# Patient Record
Sex: MALE | Race: ASIAN | NOT HISPANIC OR LATINO | URBAN - METROPOLITAN AREA
[De-identification: names, ages, dates, MRNs, and addresses within clinical notes are randomized per-mention and may not be internally consistent; named-entity substitution may affect disease eponyms.]

---

## 2023-08-08 VITALS
RESPIRATION RATE: 15 BRPM | HEART RATE: 64 BPM | SYSTOLIC BLOOD PRESSURE: 169 MMHG | OXYGEN SATURATION: 100 % | DIASTOLIC BLOOD PRESSURE: 84 MMHG | TEMPERATURE: 98 F | WEIGHT: 164.91 LBS | HEIGHT: 69 IN

## 2023-08-08 RX ORDER — CHLORHEXIDINE GLUCONATE 213 G/1000ML
1 SOLUTION TOPICAL ONCE
Refills: 0 | Status: DISCONTINUED | OUTPATIENT
Start: 2023-08-11 | End: 2023-08-12

## 2023-08-08 NOTE — H&P ADULT - ASSESSMENT
Pt is a  61 y/o M, with family hx of CAD in (brother, father, mother, and paternal grandmother former smoker (7.5 ppy smoking hx-  quit 2 months ago), ALLERGY TO ASA,  PMHx of CAD (s/p PCI 2014 ), HTN, HLD, who presents to Dr. Farrar's office with complaints of heaviness in his chest, with mild pain, and SOB on exertion. Patient reports he was recommended for open heart surgery by his doctors back in [facility], but refused. NST 07/27/2023: Small sizes, predominantly fixed, myocardial perfusion, defect of the inferior wall likely due to diaphragmatic attenuation artifact. Post stress EF: 55%. ECHO 07/27/2023: LV cavity size is normal. Mild hypertrophy. Grade I DD. No valvular abnormalities. EF: 60-65%. In light of patient's risk factors, CCS class III  angina equivalent symptoms, and abnormal NST, to undergo cardiac catheterization with possible intervention if clinically indicated.     -Pt H+H, platelets stable, Pt without any reports of BRBPR, hematuria, prior ICH, melena and no recent or previous GI bleed.   -Loaded with: [ ]81mg ASA, [ ]75mg Plavix,  [ ] ASA 325mg [ ] Plavix 600mg, [x] 180 Brilinta [ ] No Load [ ]. due to allergy to aspirin reported at home  -Pt Cr. stable- and LVEF 50%, pre cath fluids ordered per protocol [x]250ml IV bolus over 30 min, [x ] 75cc x2hrs, [ ] 50cc x2hrs, Patient euvolemic on exam.   -Malampatti II  -ASA III    Pt is a Candidate for Moderate Sedation, yes     was used for language [place] ID [id number]    Risks & benefits of procedure and alternative therapy have been explained to the patient including but not limited to: allergic reaction, bleeding w/possible need for blood transfusion, infection, renal and vascular compromise, limb damage, arrhythmia, stroke, vessel dissection/perforation, Myocardial infarction, emergent CABG. Informed consent obtained and in chart     Pt is a  63 y/o M, with family hx of CAD in (brother, father, mother, and paternal grandmother former smoker (7.5 ppy smoking hx-  quit 2 months ago), ALLERGY TO ASA,  PMHx of CAD (s/p PCI 2014 ), HTN, HLD, who presents to Dr. Farrar's office with complaints of heaviness in his chest, with mild pain, and SOB on exertion. Patient reports he was recommended for open heart surgery by his doctors back in [facility], but refused. NST 07/27/2023: Small sizes, predominantly fixed, myocardial perfusion, defect of the inferior wall likely due to diaphragmatic attenuation artifact. Post stress EF: 55%. ECHO 07/27/2023: LV cavity size is normal. Mild hypertrophy. Grade I DD. No valvular abnormalities. EF: 60-65%. In light of patient's risk factors, CCS class III  angina equivalent symptoms, and abnormal NST, to undergo cardiac catheterization with possible intervention if clinically indicated.     -Pt H+H, platelets stable, Pt without any reports of BRBPR, hematuria, prior ICH, melena and no recent or previous GI bleed.   -Loaded with: [x ]81mg ASA, [ ]75mg Plavix,  [ ] ASA 325mg [ ] Plavix 600mg, [x] 180 Brilinta [ ] No Load [ ]. Per MD, give Aspirin 81mg qd.   -Pt Cr. stable- and LVEF 50%, pre cath fluids ordered per protocol [x]250ml IV bolus over 30 min, [x ] 75cc x2hrs, [ ] 50cc x2hrs, Patient euvolemic on exam.   -Malampatti II  -ASA III    Pt is a Candidate for Moderate Sedation, yes     was used for language [facility] ID [id number]    Risks & benefits of procedure and alternative therapy have been explained to the patient including but not limited to: allergic reaction, bleeding w/possible need for blood transfusion, infection, renal and vascular compromise, limb damage, arrhythmia, stroke, vessel dissection/perforation, Myocardial infarction, emergent CABG. Informed consent obtained and in chart

## 2023-08-08 NOTE — H&P ADULT - NSHPLABSRESULTS_GEN_ALL_CORE
12.5   8.01  )-----------( 166      ( 11 Aug 2023 07:03 )             37.0         Mg     2.0     08-11        PT/INR - ( 11 Aug 2023 07:03 )   PT: 11.2 sec;   INR: 0.98          PTT - ( 11 Aug 2023 07:03 )  PTT:34.9 sec              EKG: 12.5   8.01  )-----------( 166      ( 11 Aug 2023 07:03 )             37.0         Mg     2.0     08-11        PT/INR - ( 11 Aug 2023 07:03 )   PT: 11.2 sec;   INR: 0.98          PTT - ( 11 Aug 2023 07:03 )  PTT:34.9 sec              EKG: NSR, no acute findings

## 2023-08-08 NOTE — H&P ADULT - HISTORY OF PRESENT ILLNESS
Cardiologist: Dr. Farrar   Pharmacy:   Escort:     **ASA ALLERGY**    Pt is a  63 y/o M, with family hx of CAD in (brother, father, mother, and paternal grandmother former smoker (7.5 ppy smoking hx-  quit 2 months ago), ALLERGY TO ASA,  PMHx of CAD (s/p PCI 2014 ), HTN, HLD, who presents to Dr. Farrar's office with complaints of heaviness in his chest, with mild pain, and SOB on exertion. Patient reports he was recommended for open heart surgery by his doctors back in [facility], but refused. Patient is supposed to be checking his CP at home, but has not been. Patient denies  palpitations, orthopnea, LE edema, syncope, n/v, dizziness, diaphoresis, claudication, fever, chills, recent travel, or sick contacts.     NST 07/27/2023: Small sizes, predominantly fixed, myocardial perfusion, defect of the inferior wall likely due to diaphragmatic attenuation artifact. Post stress EF: 55%. ECHO 07/27/2023: LV cavity size is normal. Mild hypertrophy. Grade I DD. No valvular abnormalities. EF: 60-65%.     In light of patient's risk factors, CCS class III  angina equivalent symptoms, and abnormal NST, to undergo cardiac catheterization with possible intervention if clinically indicated.  Cardiologist: Dr. aFrrar   Pharmacy:   Escort: Daughter    **ASA ALLERGY**     Pt is a  63 y/o M, with family hx of CAD in (brother, father, mother, and paternal grandmother former smoker (7.5 ppy smoking hx-  quit 2 months ago), ALLERGY TO ASA,  PMHx of CAD (s/p PCI 2014 ), HTN, HLD, who presents to Dr. Farrar's office with complaints of heaviness in his chest, with mild pain, and SOB on exertion. Patient reports he was recommended for open heart surgery by his doctors back in [facility], but refused. Patient is supposed to be checking his CP at home, but has not been. Patient denies  palpitations, orthopnea, LE edema, syncope, n/v, dizziness, diaphoresis, claudication, fever, chills, recent travel, or sick contacts.     NST 07/27/2023: Small sizes, predominantly fixed, myocardial perfusion, defect of the inferior wall likely due to diaphragmatic attenuation artifact. Post stress EF: 55%. ECHO 07/27/2023: LV cavity size is normal. Mild hypertrophy. Grade I DD. No valvular abnormalities. EF: 60-65%.     In light of patient's risk factors, CCS class III  angina equivalent symptoms, and abnormal NST, to undergo cardiac catheterization with possible intervention if clinically indicated.  Cardiologist: Dr. Farrar   Pharmacy: [facility]   Escort: Daughter    **ASA ALLERGY**     Pt is a  61 y/o M, with family hx of CAD in (brother, father, mother, and paternal grandmother former smoker (7.5 ppy smoking hx-  quit 2 months ago), ALLERGY TO ASA,  PMHx of CAD (s/p PCI 2014 ), HTN, HLD, who presents to Dr. Farrar's office with complaints of heaviness in his chest, with mild pain, and SOB on exertion. Patient reports he was recommended for open heart surgery by his doctors back in [facility], but refused. Patient is supposed to be checking his CP at home, but has not been. Patient denies  palpitations, orthopnea, LE edema, syncope, n/v, dizziness, diaphoresis, claudication, fever, chills, recent travel, or sick contacts.     NST 07/27/2023: Small sizes, predominantly fixed, myocardial perfusion, defect of the inferior wall likely due to diaphragmatic attenuation artifact. Post stress EF: 55%. ECHO 07/27/2023: LV cavity size is normal. Mild hypertrophy. Grade I DD. No valvular abnormalities. EF: 60-65%.     In light of patient's risk factors, CCS class III  angina equivalent symptoms, and abnormal NST, to undergo cardiac catheterization with possible intervention if clinically indicated.

## 2023-08-11 ENCOUNTER — INPATIENT (INPATIENT)
Facility: HOSPITAL | Age: 63
LOS: 0 days | Discharge: HOME | DRG: 247 | End: 2023-08-12
Attending: INTERNAL MEDICINE | Admitting: INTERNAL MEDICINE
Payer: MEDICAID

## 2023-08-11 LAB
A1C WITH ESTIMATED AVERAGE GLUCOSE RESULT: 6 % — HIGH (ref 4–5.6)
ALBUMIN SERPL ELPH-MCNC: 4.3 G/DL — SIGNIFICANT CHANGE UP (ref 3.3–5)
ALP SERPL-CCNC: 70 U/L — SIGNIFICANT CHANGE UP (ref 40–120)
ALT FLD-CCNC: 11 U/L — SIGNIFICANT CHANGE UP (ref 10–45)
ANION GAP SERPL CALC-SCNC: 10 MMOL/L — SIGNIFICANT CHANGE UP (ref 5–17)
APTT BLD: 34.9 SEC — SIGNIFICANT CHANGE UP (ref 24.5–35.6)
AST SERPL-CCNC: 16 U/L — SIGNIFICANT CHANGE UP (ref 10–40)
BASOPHILS # BLD AUTO: 0.06 K/UL — SIGNIFICANT CHANGE UP (ref 0–0.2)
BASOPHILS NFR BLD AUTO: 0.7 % — SIGNIFICANT CHANGE UP (ref 0–2)
BILIRUB SERPL-MCNC: 0.3 MG/DL — SIGNIFICANT CHANGE UP (ref 0.2–1.2)
BUN SERPL-MCNC: 11 MG/DL — SIGNIFICANT CHANGE UP (ref 7–23)
CALCIUM SERPL-MCNC: 10.2 MG/DL — SIGNIFICANT CHANGE UP (ref 8.4–10.5)
CHLORIDE SERPL-SCNC: 102 MMOL/L — SIGNIFICANT CHANGE UP (ref 96–108)
CHOLEST SERPL-MCNC: 136 MG/DL — SIGNIFICANT CHANGE UP
CK MB CFR SERPL CALC: 1.3 NG/ML — SIGNIFICANT CHANGE UP (ref 0–6.7)
CK SERPL-CCNC: 127 U/L — SIGNIFICANT CHANGE UP (ref 30–200)
CO2 SERPL-SCNC: 31 MMOL/L — SIGNIFICANT CHANGE UP (ref 22–31)
CREAT SERPL-MCNC: 1.08 MG/DL — SIGNIFICANT CHANGE UP (ref 0.5–1.3)
EGFR: 79 ML/MIN/1.73M2 — SIGNIFICANT CHANGE UP
EOSINOPHIL # BLD AUTO: 0.21 K/UL — SIGNIFICANT CHANGE UP (ref 0–0.5)
EOSINOPHIL NFR BLD AUTO: 2.6 % — SIGNIFICANT CHANGE UP (ref 0–6)
ESTIMATED AVERAGE GLUCOSE: 126 MG/DL — HIGH (ref 68–114)
GLUCOSE SERPL-MCNC: 117 MG/DL — HIGH (ref 70–99)
HCT VFR BLD CALC: 37 % — LOW (ref 39–50)
HDLC SERPL-MCNC: 39 MG/DL — LOW
HGB BLD-MCNC: 12.5 G/DL — LOW (ref 13–17)
IMM GRANULOCYTES NFR BLD AUTO: 0.2 % — SIGNIFICANT CHANGE UP (ref 0–0.9)
INR BLD: 0.98 — SIGNIFICANT CHANGE UP (ref 0.85–1.18)
LIPID PNL WITH DIRECT LDL SERPL: 52 MG/DL — SIGNIFICANT CHANGE UP
LYMPHOCYTES # BLD AUTO: 1.54 K/UL — SIGNIFICANT CHANGE UP (ref 1–3.3)
LYMPHOCYTES # BLD AUTO: 19.2 % — SIGNIFICANT CHANGE UP (ref 13–44)
MAGNESIUM SERPL-MCNC: 2 MG/DL — SIGNIFICANT CHANGE UP (ref 1.6–2.6)
MCHC RBC-ENTMCNC: 28.3 PG — SIGNIFICANT CHANGE UP (ref 27–34)
MCHC RBC-ENTMCNC: 33.8 GM/DL — SIGNIFICANT CHANGE UP (ref 32–36)
MCV RBC AUTO: 83.7 FL — SIGNIFICANT CHANGE UP (ref 80–100)
MONOCYTES # BLD AUTO: 0.74 K/UL — SIGNIFICANT CHANGE UP (ref 0–0.9)
MONOCYTES NFR BLD AUTO: 9.2 % — SIGNIFICANT CHANGE UP (ref 2–14)
NEUTROPHILS # BLD AUTO: 5.44 K/UL — SIGNIFICANT CHANGE UP (ref 1.8–7.4)
NEUTROPHILS NFR BLD AUTO: 68.1 % — SIGNIFICANT CHANGE UP (ref 43–77)
NON HDL CHOLESTEROL: 97 MG/DL — SIGNIFICANT CHANGE UP
NRBC # BLD: 0 /100 WBCS — SIGNIFICANT CHANGE UP (ref 0–0)
PLATELET # BLD AUTO: 166 K/UL — SIGNIFICANT CHANGE UP (ref 150–400)
POTASSIUM SERPL-MCNC: 4 MMOL/L — SIGNIFICANT CHANGE UP (ref 3.5–5.3)
POTASSIUM SERPL-SCNC: 4 MMOL/L — SIGNIFICANT CHANGE UP (ref 3.5–5.3)
PROT SERPL-MCNC: 7.9 G/DL — SIGNIFICANT CHANGE UP (ref 6–8.3)
PROTHROM AB SERPL-ACNC: 11.2 SEC — SIGNIFICANT CHANGE UP (ref 9.5–13)
RBC # BLD: 4.42 M/UL — SIGNIFICANT CHANGE UP (ref 4.2–5.8)
RBC # FLD: 11.8 % — SIGNIFICANT CHANGE UP (ref 10.3–14.5)
SODIUM SERPL-SCNC: 143 MMOL/L — SIGNIFICANT CHANGE UP (ref 135–145)
TRIGL SERPL-MCNC: 225 MG/DL — HIGH
WBC # BLD: 8.01 K/UL — SIGNIFICANT CHANGE UP (ref 3.8–10.5)
WBC # FLD AUTO: 8.01 K/UL — SIGNIFICANT CHANGE UP (ref 3.8–10.5)

## 2023-08-11 PROCEDURE — 93458 L HRT ARTERY/VENTRICLE ANGIO: CPT | Mod: 26,59

## 2023-08-11 PROCEDURE — 92978 ENDOLUMINL IVUS OCT C 1ST: CPT | Mod: 26,LD

## 2023-08-11 PROCEDURE — 99152 MOD SED SAME PHYS/QHP 5/>YRS: CPT

## 2023-08-11 PROCEDURE — 92928 PRQ TCAT PLMT NTRAC ST 1 LES: CPT | Mod: LD

## 2023-08-11 PROCEDURE — 93010 ELECTROCARDIOGRAM REPORT: CPT

## 2023-08-11 RX ORDER — ASPIRIN/CALCIUM CARB/MAGNESIUM 324 MG
81 TABLET ORAL DAILY
Refills: 0 | Status: DISCONTINUED | OUTPATIENT
Start: 2023-08-11 | End: 2023-08-12

## 2023-08-11 RX ORDER — METOPROLOL TARTRATE 50 MG
25 TABLET ORAL AT BEDTIME
Refills: 0 | Status: DISCONTINUED | OUTPATIENT
Start: 2023-08-11 | End: 2023-08-12

## 2023-08-11 RX ORDER — ATORVASTATIN CALCIUM 80 MG/1
40 TABLET, FILM COATED ORAL AT BEDTIME
Refills: 0 | Status: DISCONTINUED | OUTPATIENT
Start: 2023-08-11 | End: 2023-08-12

## 2023-08-11 RX ORDER — NITROGLYCERIN 6.5 MG
0.4 CAPSULE, EXTENDED RELEASE ORAL ONCE
Refills: 0 | Status: COMPLETED | OUTPATIENT
Start: 2023-08-11 | End: 2023-08-11

## 2023-08-11 RX ORDER — PANTOPRAZOLE SODIUM 20 MG/1
40 TABLET, DELAYED RELEASE ORAL
Refills: 0 | Status: DISCONTINUED | OUTPATIENT
Start: 2023-08-11 | End: 2023-08-12

## 2023-08-11 RX ORDER — VALSARTAN 80 MG/1
80 TABLET ORAL DAILY
Refills: 0 | Status: DISCONTINUED | OUTPATIENT
Start: 2023-08-11 | End: 2023-08-12

## 2023-08-11 RX ORDER — ISOSORBIDE MONONITRATE 60 MG/1
30 TABLET, EXTENDED RELEASE ORAL AT BEDTIME
Refills: 0 | Status: DISCONTINUED | OUTPATIENT
Start: 2023-08-11 | End: 2023-08-12

## 2023-08-11 RX ORDER — NITROGLYCERIN 6.5 MG
0.4 CAPSULE, EXTENDED RELEASE ORAL ONCE
Refills: 0 | Status: DISCONTINUED | OUTPATIENT
Start: 2023-08-11 | End: 2023-08-11

## 2023-08-11 RX ORDER — TICAGRELOR 90 MG/1
90 TABLET ORAL EVERY 12 HOURS
Refills: 0 | Status: DISCONTINUED | OUTPATIENT
Start: 2023-08-11 | End: 2023-08-12

## 2023-08-11 RX ORDER — ASPIRIN/CALCIUM CARB/MAGNESIUM 324 MG
325 TABLET ORAL ONCE
Refills: 0 | Status: DISCONTINUED | OUTPATIENT
Start: 2023-08-11 | End: 2023-08-11

## 2023-08-11 RX ORDER — SODIUM CHLORIDE 9 MG/ML
1000 INJECTION INTRAMUSCULAR; INTRAVENOUS; SUBCUTANEOUS
Refills: 0 | Status: DISCONTINUED | OUTPATIENT
Start: 2023-08-11 | End: 2023-08-11

## 2023-08-11 RX ORDER — ASPIRIN/CALCIUM CARB/MAGNESIUM 324 MG
243 TABLET ORAL ONCE
Refills: 0 | Status: COMPLETED | OUTPATIENT
Start: 2023-08-11 | End: 2023-08-11

## 2023-08-11 RX ORDER — MORPHINE SULFATE 50 MG/1
1 CAPSULE, EXTENDED RELEASE ORAL ONCE
Refills: 0 | Status: DISCONTINUED | OUTPATIENT
Start: 2023-08-11 | End: 2023-08-11

## 2023-08-11 RX ORDER — SODIUM CHLORIDE 9 MG/ML
500 INJECTION INTRAMUSCULAR; INTRAVENOUS; SUBCUTANEOUS
Refills: 0 | Status: DISCONTINUED | OUTPATIENT
Start: 2023-08-11 | End: 2023-08-11

## 2023-08-11 RX ORDER — AMLODIPINE BESYLATE 2.5 MG/1
5 TABLET ORAL DAILY
Refills: 0 | Status: DISCONTINUED | OUTPATIENT
Start: 2023-08-11 | End: 2023-08-12

## 2023-08-11 RX ORDER — ASPIRIN/CALCIUM CARB/MAGNESIUM 324 MG
81 TABLET ORAL ONCE
Refills: 0 | Status: COMPLETED | OUTPATIENT
Start: 2023-08-11 | End: 2023-08-11

## 2023-08-11 RX ORDER — TICAGRELOR 90 MG/1
1 TABLET ORAL
Qty: 60 | Refills: 0
Start: 2023-08-11 | End: 2023-09-09

## 2023-08-11 RX ORDER — SODIUM CHLORIDE 9 MG/ML
1000 INJECTION INTRAMUSCULAR; INTRAVENOUS; SUBCUTANEOUS
Refills: 0 | Status: DISCONTINUED | OUTPATIENT
Start: 2023-08-11 | End: 2023-08-12

## 2023-08-11 RX ORDER — ACETAMINOPHEN 500 MG
650 TABLET ORAL ONCE
Refills: 0 | Status: COMPLETED | OUTPATIENT
Start: 2023-08-11 | End: 2023-08-11

## 2023-08-11 RX ORDER — ISOSORBIDE MONONITRATE 60 MG/1
30 TABLET, EXTENDED RELEASE ORAL DAILY
Refills: 0 | Status: DISCONTINUED | OUTPATIENT
Start: 2023-08-11 | End: 2023-08-11

## 2023-08-11 RX ORDER — SODIUM CHLORIDE 9 MG/ML
250 INJECTION INTRAMUSCULAR; INTRAVENOUS; SUBCUTANEOUS ONCE
Refills: 0 | Status: COMPLETED | OUTPATIENT
Start: 2023-08-11 | End: 2023-08-11

## 2023-08-11 RX ORDER — ASPIRIN/CALCIUM CARB/MAGNESIUM 324 MG
243 TABLET ORAL DAILY
Refills: 0 | Status: DISCONTINUED | OUTPATIENT
Start: 2023-08-11 | End: 2023-08-11

## 2023-08-11 RX ORDER — TICAGRELOR 90 MG/1
180 TABLET ORAL ONCE
Refills: 0 | Status: COMPLETED | OUTPATIENT
Start: 2023-08-11 | End: 2023-08-11

## 2023-08-11 RX ADMIN — Medication 81 MILLIGRAM(S): at 15:58

## 2023-08-11 RX ADMIN — Medication 81 MILLIGRAM(S): at 10:13

## 2023-08-11 RX ADMIN — Medication 25 MILLIGRAM(S): at 22:50

## 2023-08-11 RX ADMIN — MORPHINE SULFATE 1 MILLIGRAM(S): 50 CAPSULE, EXTENDED RELEASE ORAL at 18:21

## 2023-08-11 RX ADMIN — SODIUM CHLORIDE 500 MILLILITER(S): 9 INJECTION INTRAMUSCULAR; INTRAVENOUS; SUBCUTANEOUS at 08:40

## 2023-08-11 RX ADMIN — SODIUM CHLORIDE 100 MILLILITER(S): 9 INJECTION INTRAMUSCULAR; INTRAVENOUS; SUBCUTANEOUS at 23:32

## 2023-08-11 RX ADMIN — Medication 650 MILLIGRAM(S): at 17:50

## 2023-08-11 RX ADMIN — Medication 243 MILLIGRAM(S): at 15:58

## 2023-08-11 RX ADMIN — ATORVASTATIN CALCIUM 40 MILLIGRAM(S): 80 TABLET, FILM COATED ORAL at 22:51

## 2023-08-11 RX ADMIN — ISOSORBIDE MONONITRATE 30 MILLIGRAM(S): 60 TABLET, EXTENDED RELEASE ORAL at 18:27

## 2023-08-11 RX ADMIN — Medication 650 MILLIGRAM(S): at 18:26

## 2023-08-11 RX ADMIN — SODIUM CHLORIDE 220 MILLILITER(S): 9 INJECTION INTRAMUSCULAR; INTRAVENOUS; SUBCUTANEOUS at 16:00

## 2023-08-11 RX ADMIN — PANTOPRAZOLE SODIUM 40 MILLIGRAM(S): 20 TABLET, DELAYED RELEASE ORAL at 17:16

## 2023-08-11 RX ADMIN — SODIUM CHLORIDE 75 MILLILITER(S): 9 INJECTION INTRAMUSCULAR; INTRAVENOUS; SUBCUTANEOUS at 08:40

## 2023-08-11 RX ADMIN — MORPHINE SULFATE 1 MILLIGRAM(S): 50 CAPSULE, EXTENDED RELEASE ORAL at 19:00

## 2023-08-11 RX ADMIN — TICAGRELOR 180 MILLIGRAM(S): 90 TABLET ORAL at 08:39

## 2023-08-11 RX ADMIN — AMLODIPINE BESYLATE 5 MILLIGRAM(S): 2.5 TABLET ORAL at 17:17

## 2023-08-11 RX ADMIN — Medication 0.4 MILLIGRAM(S): at 16:03

## 2023-08-11 RX ADMIN — TICAGRELOR 90 MILLIGRAM(S): 90 TABLET ORAL at 22:51

## 2023-08-11 RX ADMIN — VALSARTAN 80 MILLIGRAM(S): 80 TABLET ORAL at 17:16

## 2023-08-11 NOTE — CHART NOTE - NSCHARTNOTEFT_GEN_A_CORE
Pa called to bedside as patient experiencing 8/10 CP. Patient complained of the CP post procedure in cath lab holding. Patient was given SL nitro with improvement. EKG performed - revealed that ST depressions more pronounced than post procedure in inferior leads. Interventional team - Fellow, Guille and attending, Dr. Farrar made aware that patient's Diagonal branch was jailed during the procedure. Dr. Farrar instructed to keep SBP <140 mmHg throughout the night and to continue to give SL nitroglycerin for CP as needed. Will continue to monitor.

## 2023-08-12 VITALS
OXYGEN SATURATION: 100 % | SYSTOLIC BLOOD PRESSURE: 143 MMHG | TEMPERATURE: 98 F | DIASTOLIC BLOOD PRESSURE: 75 MMHG | HEART RATE: 77 BPM | RESPIRATION RATE: 18 BRPM

## 2023-08-12 LAB
ANION GAP SERPL CALC-SCNC: 13 MMOL/L — SIGNIFICANT CHANGE UP (ref 5–17)
BUN SERPL-MCNC: 13 MG/DL — SIGNIFICANT CHANGE UP (ref 7–23)
CALCIUM SERPL-MCNC: 9.2 MG/DL — SIGNIFICANT CHANGE UP (ref 8.4–10.5)
CHLORIDE SERPL-SCNC: 100 MMOL/L — SIGNIFICANT CHANGE UP (ref 96–108)
CO2 SERPL-SCNC: 24 MMOL/L — SIGNIFICANT CHANGE UP (ref 22–31)
CREAT SERPL-MCNC: 0.91 MG/DL — SIGNIFICANT CHANGE UP (ref 0.5–1.3)
EGFR: 96 ML/MIN/1.73M2 — SIGNIFICANT CHANGE UP
GLUCOSE SERPL-MCNC: 103 MG/DL — HIGH (ref 70–99)
HCT VFR BLD CALC: 35.3 % — LOW (ref 39–50)
HCV AB S/CO SERPL IA: 0.12 S/CO — SIGNIFICANT CHANGE UP
HCV AB SERPL-IMP: SIGNIFICANT CHANGE UP
HGB BLD-MCNC: 11.6 G/DL — LOW (ref 13–17)
MAGNESIUM SERPL-MCNC: 2 MG/DL — SIGNIFICANT CHANGE UP (ref 1.6–2.6)
MCHC RBC-ENTMCNC: 27.7 PG — SIGNIFICANT CHANGE UP (ref 27–34)
MCHC RBC-ENTMCNC: 32.9 GM/DL — SIGNIFICANT CHANGE UP (ref 32–36)
MCV RBC AUTO: 84.2 FL — SIGNIFICANT CHANGE UP (ref 80–100)
NRBC # BLD: 0 /100 WBCS — SIGNIFICANT CHANGE UP (ref 0–0)
PLATELET # BLD AUTO: 173 K/UL — SIGNIFICANT CHANGE UP (ref 150–400)
POTASSIUM SERPL-MCNC: 3 MMOL/L — LOW (ref 3.5–5.3)
POTASSIUM SERPL-SCNC: 3 MMOL/L — LOW (ref 3.5–5.3)
RBC # BLD: 4.19 M/UL — LOW (ref 4.2–5.8)
RBC # FLD: 12.2 % — SIGNIFICANT CHANGE UP (ref 10.3–14.5)
SODIUM SERPL-SCNC: 137 MMOL/L — SIGNIFICANT CHANGE UP (ref 135–145)
WBC # BLD: 10.69 K/UL — HIGH (ref 3.8–10.5)
WBC # FLD AUTO: 10.69 K/UL — HIGH (ref 3.8–10.5)

## 2023-08-12 PROCEDURE — 82553 CREATINE MB FRACTION: CPT

## 2023-08-12 PROCEDURE — C1894: CPT

## 2023-08-12 PROCEDURE — 85610 PROTHROMBIN TIME: CPT

## 2023-08-12 PROCEDURE — 80061 LIPID PANEL: CPT

## 2023-08-12 PROCEDURE — 85347 COAGULATION TIME ACTIVATED: CPT

## 2023-08-12 PROCEDURE — 82550 ASSAY OF CK (CPK): CPT

## 2023-08-12 PROCEDURE — 85025 COMPLETE CBC W/AUTO DIFF WBC: CPT

## 2023-08-12 PROCEDURE — C1725: CPT

## 2023-08-12 PROCEDURE — 99239 HOSP IP/OBS DSCHRG MGMT >30: CPT

## 2023-08-12 PROCEDURE — C1753: CPT

## 2023-08-12 PROCEDURE — 36415 COLL VENOUS BLD VENIPUNCTURE: CPT

## 2023-08-12 PROCEDURE — 83735 ASSAY OF MAGNESIUM: CPT

## 2023-08-12 PROCEDURE — 80053 COMPREHEN METABOLIC PANEL: CPT

## 2023-08-12 PROCEDURE — 83036 HEMOGLOBIN GLYCOSYLATED A1C: CPT

## 2023-08-12 PROCEDURE — 85730 THROMBOPLASTIN TIME PARTIAL: CPT

## 2023-08-12 PROCEDURE — 85027 COMPLETE CBC AUTOMATED: CPT

## 2023-08-12 PROCEDURE — C1874: CPT

## 2023-08-12 PROCEDURE — 86803 HEPATITIS C AB TEST: CPT

## 2023-08-12 PROCEDURE — C1887: CPT

## 2023-08-12 PROCEDURE — 93005 ELECTROCARDIOGRAM TRACING: CPT

## 2023-08-12 PROCEDURE — C1769: CPT

## 2023-08-12 PROCEDURE — 80048 BASIC METABOLIC PNL TOTAL CA: CPT

## 2023-08-12 RX ORDER — AMLODIPINE BESYLATE 2.5 MG/1
5 TABLET ORAL ONCE
Refills: 0 | Status: COMPLETED | OUTPATIENT
Start: 2023-08-12 | End: 2023-08-12

## 2023-08-12 RX ORDER — METOPROLOL TARTRATE 50 MG
1 TABLET ORAL
Qty: 30 | Refills: 3
Start: 2023-08-12 | End: 2023-12-09

## 2023-08-12 RX ORDER — ROSUVASTATIN CALCIUM 5 MG/1
1 TABLET ORAL
Qty: 30 | Refills: 3
Start: 2023-08-12 | End: 2023-12-09

## 2023-08-12 RX ORDER — ASPIRIN/CALCIUM CARB/MAGNESIUM 324 MG
1 TABLET ORAL
Qty: 30 | Refills: 11
Start: 2023-08-12 | End: 2024-08-05

## 2023-08-12 RX ORDER — ROSUVASTATIN CALCIUM 5 MG/1
1 TABLET ORAL
Refills: 0 | DISCHARGE

## 2023-08-12 RX ORDER — TICAGRELOR 90 MG/1
1 TABLET ORAL
Qty: 60 | Refills: 11
Start: 2023-08-12 | End: 2024-08-05

## 2023-08-12 RX ORDER — AMLODIPINE BESYLATE 2.5 MG/1
1 TABLET ORAL
Refills: 0 | DISCHARGE

## 2023-08-12 RX ORDER — VALSARTAN 80 MG/1
1 TABLET ORAL
Qty: 30 | Refills: 3
Start: 2023-08-12 | End: 2023-12-09

## 2023-08-12 RX ORDER — TICAGRELOR 90 MG/1
1 TABLET ORAL
Qty: 60 | Refills: 0
Start: 2023-08-12 | End: 2023-09-10

## 2023-08-12 RX ORDER — POTASSIUM CHLORIDE 20 MEQ
40 PACKET (EA) ORAL ONCE
Refills: 0 | Status: COMPLETED | OUTPATIENT
Start: 2023-08-12 | End: 2023-08-12

## 2023-08-12 RX ORDER — CLOPIDOGREL BISULFATE 75 MG/1
1 TABLET, FILM COATED ORAL
Refills: 0 | DISCHARGE

## 2023-08-12 RX ORDER — AMLODIPINE BESYLATE 2.5 MG/1
1 TABLET ORAL
Qty: 30 | Refills: 3
Start: 2023-08-12 | End: 2023-12-09

## 2023-08-12 RX ORDER — CHLORTHALIDONE 50 MG
0.25 TABLET ORAL
Refills: 0 | DISCHARGE

## 2023-08-12 RX ORDER — ISOSORBIDE MONONITRATE 60 MG/1
1 TABLET, EXTENDED RELEASE ORAL
Refills: 0 | DISCHARGE

## 2023-08-12 RX ORDER — POTASSIUM CHLORIDE 20 MEQ
20 PACKET (EA) ORAL ONCE
Refills: 0 | Status: COMPLETED | OUTPATIENT
Start: 2023-08-12 | End: 2023-08-12

## 2023-08-12 RX ORDER — VALSARTAN 80 MG/1
1 TABLET ORAL
Refills: 0 | DISCHARGE

## 2023-08-12 RX ORDER — AMLODIPINE BESYLATE 2.5 MG/1
10 TABLET ORAL DAILY
Refills: 0 | Status: DISCONTINUED | OUTPATIENT
Start: 2023-08-13 | End: 2023-08-12

## 2023-08-12 RX ORDER — ISOSORBIDE MONONITRATE 60 MG/1
1 TABLET, EXTENDED RELEASE ORAL
Qty: 30 | Refills: 3
Start: 2023-08-12 | End: 2023-12-09

## 2023-08-12 RX ADMIN — Medication 81 MILLIGRAM(S): at 11:39

## 2023-08-12 RX ADMIN — AMLODIPINE BESYLATE 5 MILLIGRAM(S): 2.5 TABLET ORAL at 09:31

## 2023-08-12 RX ADMIN — VALSARTAN 80 MILLIGRAM(S): 80 TABLET ORAL at 06:06

## 2023-08-12 RX ADMIN — PANTOPRAZOLE SODIUM 40 MILLIGRAM(S): 20 TABLET, DELAYED RELEASE ORAL at 06:06

## 2023-08-12 RX ADMIN — Medication 50 MILLIEQUIVALENT(S): at 09:31

## 2023-08-12 RX ADMIN — AMLODIPINE BESYLATE 5 MILLIGRAM(S): 2.5 TABLET ORAL at 06:06

## 2023-08-12 RX ADMIN — TICAGRELOR 90 MILLIGRAM(S): 90 TABLET ORAL at 09:32

## 2023-08-12 RX ADMIN — Medication 40 MILLIEQUIVALENT(S): at 09:32

## 2023-08-12 NOTE — DISCHARGE NOTE PROVIDER - NSDCFUADDINST_GEN_ALL_CORE_FT
Aspirin and Brilinta can put you at increased risk of bleeding; please avoid NSAIDS (such as Motrin, Advil, Ibuprofen, Naproxen, or Aleve, as these medications may further your risk of bleeding

## 2023-08-12 NOTE — DISCHARGE NOTE PROVIDER - HOSPITAL COURSE
62M, former smoker, ASA allergy, significant FHx of CAD and PMHx of HTN, HLD and CAD s/p PCI (2014), presented to [facility] for recommended cardiac cath w/ possible intervention if clinically indicated, in light of pts risk factors, CCS class III anginal symptoms and abnormal NST.  Pt now s/p cardiac cath 8/11/23: IVUS guided Scoreflex/RUSH pLAD; LM mild (Ca++), mLAD 30%, dLAD mild, D1 60% (small vessel), OM1 60%, mLCx 10% ISR, pRCA 80%, access R radial artery. Plan to return for staged PCI of residual RCA in 4-6 weeks. Post cath pt endorsing substernal 8/10 CP, EKG revealed ST depressions more pronounced than post procedure in inferior leads. Interventional team - Fellow, Guille and attending, Dr. Farrar made aware, and notified that patient's Diagonal branch was jailed during the procedure and likely cause of pain. Pain improved w/ SL NTG. Pt admitted overnight for observation and telemetry monitoring. Pt seen and examined at bedside this AM without any complaints or events overnight, VSS, labs and telemetry reviewed and pt stable for discharge as discussed with Dr. Novoa. Pt has received appropriate discharge instructions, including medication regimen, access site management and follow up with Dr. Farrar in 1-2 weeks.    Discharge medications: ASA 81mg QD, Brilinta 90mg BID, Toprol 25mg QD, Crestor 20mg HS, Imdur 30mg QD, Norvasc 10mg QD, Valsartan 80mg QD.      Reasons for No Cardiac Rehab Referral Rx (Must select 1 or more options):                Patient Refused            Medical Reason: ex needs Home Care, Home PT            Patient lacks medical coverage for Cardiac Rehab            Pt discharged to Nursing Care/MELY/Long term Care Facility            Patient Lacks Transportation or no cardiac rehab within 60 minutes driving range            Patient already participates in Cardiac Rehab            Other: RESIDUAL CAD W/ PLAN FOR STAGED PCI

## 2023-08-12 NOTE — DISCHARGE NOTE PROVIDER - CARE PROVIDER_API CALL
Daniel Farrar.  Cardiovascular Disease  [address] [place] [address]  Phone: [phone]  Fax: [phone]  Follow Up Time: 1 week

## 2023-08-12 NOTE — DISCHARGE NOTE PROVIDER - NSDCCPCAREPLAN_GEN_ALL_CORE_FT
PRINCIPAL DISCHARGE DIAGNOSIS  Diagnosis: CAD (coronary artery disease)  Assessment and Plan of Treatment: You were found to have blockages in the arteries of your heart, also known as Coronary Artery Disease. You underwent a cardiac catheterization on 8/11/23 and received a stent to the left anterior descending artery. You have residual blockage in the right coronary artery which you must return in 4-6 weeks for a stent.  Please STOP Plavix (Clopidogrel) 75mg.   PLEASE CONTINUE ASPIRIN 81MG DAILY AND BRILINTA 80MG TWICE DAILY. DO NOT STOP THESE MEDICATIONS FOR ANY REASON AS THEY ARE KEEPING YOUR STENT OPEN AND PREVENTING A HEART ATTACK.  Avoid strenuous activity or heavy lifting anything more than 5lbs for the next five days. Do not take a bath or swim for the next five days; you may shower. For any bleeding or hematoma formation (hardened blood collection under the skin) at the access site of your right wrist please hold pressure and go to the emergency room. Please follow up with Dr. Farrar in 1-2 weeks. For recurrent chest pain, please call your doctor or go to the emergency room.      SECONDARY DISCHARGE DIAGNOSES  Diagnosis: HTN (hypertension)  Assessment and Plan of Treatment: Please STOP Chlorthalidone.  Please INCREASE Amlodipine to 10mg in the morning, START Metoprolol XL 25mg at bedtime and CONTINUE Valsartan 80mg in the morning and Imdur 30mg at bedtime as listed to keep your blood pressure controlled. For blood pressure that is too high or too low please see your doctor or go to the emergency room as necessary.    Diagnosis: HLD (hyperlipidemia)  Assessment and Plan of Treatment: Please INCREASE Crestor (Rosuvastatin) to 20mg at bedtime to keep your cholesterol low. High cholesterol contributes to heart disease.

## 2023-08-12 NOTE — DISCHARGE NOTE PROVIDER - NSDCMRMEDTOKEN_GEN_ALL_CORE_FT
amLODIPine 10 mg oral tablet: 1 tab(s) orally once a day  aspirin 81 mg oral delayed release tablet: 1 tab(s) orally once a day  Brilinta (ticagrelor) 90 mg oral tablet: 1 tab(s) orally 2 times a day  Crestor 20 mg oral tablet: 1 tab(s) orally once a day  esomeprazole 20 mg oral delayed release capsule: 1 orally 2 times a day as needed for Acid reflux  isosorbide mononitrate 30 mg oral tablet, extended release: 1 tab(s) orally once a day  metoprolol succinate 25 mg oral tablet, extended release: 1 tab(s) orally once a day (at bedtime)  valsartan 80 mg oral tablet: 1 tab(s) orally once a day (in the morning)

## 2023-08-12 NOTE — DISCHARGE NOTE NURSING/CASE MANAGEMENT/SOCIAL WORK - PATIENT PORTAL LINK FT
You can access the FollowMyHealth Patient Portal offered by [facility] by registering at the following website: [email or web address]. By joining [employer]’s FollowMyHealth portal, you will also be able to view your health information using other applications (apps) compatible with our system.

## 2023-08-14 LAB — ISTAT ACTK (ACTIVATED CLOTTING TIME KAOLIN): 359 SEC — HIGH (ref 74–137)

## 2023-08-17 DIAGNOSIS — Z79.02 LONG TERM (CURRENT) USE OF ANTITHROMBOTICS/ANTIPLATELETS: ICD-10-CM

## 2023-08-17 DIAGNOSIS — Z88.1 ALLERGY STATUS TO OTHER ANTIBIOTIC AGENTS STATUS: ICD-10-CM

## 2023-08-17 DIAGNOSIS — Z88.6 ALLERGY STATUS TO ANALGESIC AGENT: ICD-10-CM

## 2023-08-17 DIAGNOSIS — I25.84 CORONARY ATHEROSCLEROSIS DUE TO CALCIFIED CORONARY LESION: ICD-10-CM

## 2023-08-17 DIAGNOSIS — Z82.49 FAMILY HISTORY OF ISCHEMIC HEART DISEASE AND OTHER DISEASES OF THE CIRCULATORY SYSTEM: ICD-10-CM

## 2023-08-17 DIAGNOSIS — Z95.5 PRESENCE OF CORONARY ANGIOPLASTY IMPLANT AND GRAFT: ICD-10-CM

## 2023-08-17 DIAGNOSIS — R94.39 ABNORMAL RESULT OF OTHER CARDIOVASCULAR FUNCTION STUDY: ICD-10-CM

## 2023-08-17 DIAGNOSIS — I10 ESSENTIAL (PRIMARY) HYPERTENSION: ICD-10-CM
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
## 2023-08-17 DIAGNOSIS — Z87.891 PERSONAL HISTORY OF NICOTINE DEPENDENCE: ICD-10-CM

## 2023-08-17 DIAGNOSIS — T82.897A OTHER SPECIFIED COMPLICATION OF CARDIAC PROSTHETIC DEVICES, IMPLANTS AND GRAFTS, INITIAL ENCOUNTER: ICD-10-CM

## 2023-08-17 DIAGNOSIS — E78.00 PURE HYPERCHOLESTEROLEMIA, UNSPECIFIED: ICD-10-CM

## 2023-08-17 DIAGNOSIS — I25.119 ATHEROSCLEROTIC HEART DISEASE OF NATIVE CORONARY ARTERY WITH UNSPECIFIED ANGINA PECTORIS: ICD-10-CM
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
## 2023-09-06 PROBLEM — E78.5 HYPERLIPIDEMIA, UNSPECIFIED: Chronic | Status: ACTIVE | Noted: 2023-08-08

## 2023-09-06 PROBLEM — I10 ESSENTIAL (PRIMARY) HYPERTENSION: Chronic | Status: ACTIVE | Noted: 2023-08-08

## 2023-09-06 PROBLEM — I25.10 ATHEROSCLEROTIC HEART DISEASE OF NATIVE CORONARY ARTERY WITHOUT ANGINA PECTORIS: Chronic | Status: ACTIVE | Noted: 2023-08-08

## 2023-09-15 VITALS
SYSTOLIC BLOOD PRESSURE: 155 MMHG | DIASTOLIC BLOOD PRESSURE: 79 MMHG | OXYGEN SATURATION: 97 % | TEMPERATURE: 97 F | RESPIRATION RATE: 16 BRPM | HEART RATE: 61 BPM | WEIGHT: 164.91 LBS | HEIGHT: 70 IN

## 2023-09-15 RX ORDER — CHLORHEXIDINE GLUCONATE 213 G/1000ML
1 SOLUTION TOPICAL ONCE
Refills: 0 | Status: DISCONTINUED | OUTPATIENT
Start: 2023-09-22 | End: 2023-10-06

## 2023-09-15 NOTE — H&P ADULT - ASSESSMENT
62 M, former smoker (previously documented ASA allergy however patient received ASA in the hospital and tolerated it well and was discharged on ASA 81 mg daily), significant FHx of CAD and PMHx of HTN, HLD and CAD s/p PCI (2014) with subsequent IVUS guided Scoreflex/RUSH pLAD 8/11/23 who presents for staged PCI of residual pRCA disease. In light of patient’s risk factors and known residual CAD patient now presents for staged PCI of residual pRCA disease.     ASA III          Mallampati III  Patient is a candidate for sedation: yes  Sedation: Moderate    Risks & benefits of procedure and alternative therapy have been explained to the patient including but not limited to: allergic reaction, bleeding w/possible need for blood transfusion, infection, renal and vascular compromise, limb damage, arrhythmia, stroke, vessel dissection/perforation, Myocardial infarction, emergent CABG. Informed consent obtained and in chart.       Hgb, hct, platelets wnl. Patient denies bleeding, BRBPR, melena, hematuria, hematemesis. Pt compliant with home aspirin 81mg LD 9/21/23 and Brilinta 90mg BID LD 9/21/23. Pt given aspirin 81mg x 1 and brilinta 90mg x 1 preprocedure.       Pt  euvolemic on exam. Cr wnl. EF 55%.  cc Bolus IV x 1 followed by NS 75 cc/hr x 2 hrs per Hydration Protocol.

## 2023-09-15 NOTE — H&P ADULT - NSHPLABSRESULTS_GEN_ALL_CORE
12.5   7.51  )-----------( 171      ( 22 Sep 2023 07:53 )             37.0               PT/INR - ( 22 Sep 2023 07:53 )   PT: 10.8 sec;   INR: 0.95          PTT - ( 22 Sep 2023 07:53 )  PTT:35.0 sec              EKG: Sinus gissel @ 56 bpm.

## 2023-09-15 NOTE — H&P ADULT - HISTORY OF PRESENT ILLNESS
Cardiologist: Dr. Farrar   Pharmacy:   Escort:     Confirm meds and if patient experiencing any symptoms    62 M, former smoker (previously documented ASA allergy however patient received ASA in the hospital and tolerated it well and was discharged on ASA 81 mg daily), significant FHx of CAD and PMHx of HTN, HLD and CAD s/p PCI (2014) with subsequent IVUS guided Scoreflex/RUSH pLAD 8/11/23 who presents for staged PCI of residual RCA disease. Patient reports ______     He reports compliance with DAPT (ASA and Brilinta). Patient originally presented to Dr. Farrar c/o exertional C/P and MORRIS. He was recommended for CTS by his physicians in [facility] however he refused. Subsequent nuclear stress test 7/27/23 revealed Small size, predominantly fixed, myocardial perfusion, defect of the inferior wall likely due to diaphragmatic attenuation artifact. Post stress EF: 55%. Patient underwent successful IVUS guided/Scoreflex/RUSH pLAD on 8/11/23. In light of patient’s risk factors and known residual CAD patient now presents for staged PCI of residual RCA disease.      Cardiac Cath 8/11/23 at [facility]: IVUS guided Scoreflex/RUSH pLAD; LM mild (Ca++), mLAD 30%, dLAD mild, D1 60% (small vessel), OM1 60%, mLCx 10% ISR, pRCA 80%.  Cardiologist: Dr. Farrar   Pharmacy:   Escort:     Confirm meds and if patient experiencing any symptoms    62 M, former smoker (previously documented ASA allergy however patient received ASA in the hospital and tolerated it well and was discharged on ASA 81 mg daily), significant FHx of CAD and PMHx of HTN, HLD and CAD s/p PCI (2014) with subsequent IVUS guided Scoreflex/RUSH pLAD 8/11/23 who presents for staged PCI of residual pRCA disease. Patient reports ______     He reports compliance with DAPT (ASA and Brilinta). Patient originally presented to Dr. Farrar c/o exertional C/P and MORRIS. He was recommended for CTS by his physicians in [facility] however he refused. Subsequent nuclear stress test 7/27/23 revealed Small size, predominantly fixed, myocardial perfusion, defect of the inferior wall likely due to diaphragmatic attenuation artifact. Post stress EF: 55%. Patient underwent successful IVUS guided/Scoreflex/RUSH pLAD on 8/11/23. In light of patient’s risk factors and known residual CAD patient now presents for staged PCI of residual pRCA disease.      Cardiac Cath 8/11/23 at [facility]: IVUS guided Scoreflex/RUSH pLAD; LM mild (Ca++), mLAD 30%, dLAD mild, D1 60% (small vessel), OM1 60%, mLCx 10% ISR, pRCA 80%.  Cardiologist: Dr. Farrar   Pharmacy: [facility]  Escort: Son    62 M, former smoker (previously documented ASA allergy however patient received ASA in the hospital and tolerated it well and was discharged on ASA 81 mg daily), significant FHx of CAD and PMHx of HTN, HLD and CAD s/p PCI (2014) with subsequent IVUS guided Scoreflex/RUSH pLAD 8/11/23 who presents for staged PCI of residual pRCA disease. Patient reports feeling well since the procedure and denies feeling any sx.    He reports compliance with DAPT (ASA and Brilinta). Patient originally presented to Dr. Farrar c/o exertional C/P and MORRIS. He was recommended for CTS by his physicians in [facility] however he refused. Subsequent nuclear stress test 7/27/23 revealed Small size, predominantly fixed, myocardial perfusion, defect of the inferior wall likely due to diaphragmatic attenuation artifact. Post stress EF: 55%. Patient underwent successful IVUS guided/Scoreflex/RUSH pLAD on 8/11/23. In light of patient’s risk factors and known residual CAD patient now presents for staged PCI of residual pRCA disease.      Cardiac Cath 8/11/23 at [facility]: IVUS guided Scoreflex/RUSH pLAD; LM mild (Ca++), mLAD 30%, dLAD mild, D1 60% (small vessel), OM1 60%, mLCx 10% ISR, pRCA 80%.

## 2023-09-22 ENCOUNTER — OUTPATIENT (OUTPATIENT)
Dept: OUTPATIENT SERVICES | Facility: HOSPITAL | Age: 63
LOS: 1 days | Discharge: HOME | End: 2023-09-22
Payer: MEDICAID

## 2023-09-22 LAB
A1C WITH ESTIMATED AVERAGE GLUCOSE RESULT: 6.2 % — HIGH (ref 4–5.6)
ALBUMIN SERPL ELPH-MCNC: 4.6 G/DL — SIGNIFICANT CHANGE UP (ref 3.3–5)
ALP SERPL-CCNC: 69 U/L — SIGNIFICANT CHANGE UP (ref 40–120)
ALT FLD-CCNC: 15 U/L — SIGNIFICANT CHANGE UP (ref 10–45)
ANION GAP SERPL CALC-SCNC: 10 MMOL/L — SIGNIFICANT CHANGE UP (ref 5–17)
ANION GAP SERPL CALC-SCNC: 8 MMOL/L — SIGNIFICANT CHANGE UP (ref 5–17)
APTT BLD: 35 SEC — SIGNIFICANT CHANGE UP (ref 24.5–35.6)
AST SERPL-CCNC: 16 U/L — SIGNIFICANT CHANGE UP (ref 10–40)
BASOPHILS # BLD AUTO: 0.06 K/UL — SIGNIFICANT CHANGE UP (ref 0–0.2)
BASOPHILS NFR BLD AUTO: 0.8 % — SIGNIFICANT CHANGE UP (ref 0–2)
BILIRUB SERPL-MCNC: 0.4 MG/DL — SIGNIFICANT CHANGE UP (ref 0.2–1.2)
BUN SERPL-MCNC: 12 MG/DL — SIGNIFICANT CHANGE UP (ref 7–23)
BUN SERPL-MCNC: 12 MG/DL — SIGNIFICANT CHANGE UP (ref 7–23)
CALCIUM SERPL-MCNC: 9 MG/DL — SIGNIFICANT CHANGE UP (ref 8.4–10.5)
CALCIUM SERPL-MCNC: 9.3 MG/DL — SIGNIFICANT CHANGE UP (ref 8.4–10.5)
CHLORIDE SERPL-SCNC: 104 MMOL/L — SIGNIFICANT CHANGE UP (ref 96–108)
CHLORIDE SERPL-SCNC: 107 MMOL/L — SIGNIFICANT CHANGE UP (ref 96–108)
CHOLEST SERPL-MCNC: 115 MG/DL — SIGNIFICANT CHANGE UP
CK MB CFR SERPL CALC: 2 NG/ML — SIGNIFICANT CHANGE UP (ref 0–6.7)
CK SERPL-CCNC: 146 U/L — SIGNIFICANT CHANGE UP (ref 30–200)
CO2 SERPL-SCNC: 25 MMOL/L — SIGNIFICANT CHANGE UP (ref 22–31)
CO2 SERPL-SCNC: 27 MMOL/L — SIGNIFICANT CHANGE UP (ref 22–31)
CREAT SERPL-MCNC: 0.86 MG/DL — SIGNIFICANT CHANGE UP (ref 0.5–1.3)
CREAT SERPL-MCNC: 0.89 MG/DL — SIGNIFICANT CHANGE UP (ref 0.5–1.3)
EGFR: 98 ML/MIN/1.73M2 — SIGNIFICANT CHANGE UP
EGFR: 99 ML/MIN/1.73M2 — SIGNIFICANT CHANGE UP
EOSINOPHIL # BLD AUTO: 0.24 K/UL — SIGNIFICANT CHANGE UP (ref 0–0.5)
EOSINOPHIL NFR BLD AUTO: 3.2 % — SIGNIFICANT CHANGE UP (ref 0–6)
ESTIMATED AVERAGE GLUCOSE: 131 MG/DL — HIGH (ref 68–114)
GLUCOSE SERPL-MCNC: 118 MG/DL — HIGH (ref 70–99)
GLUCOSE SERPL-MCNC: 128 MG/DL — HIGH (ref 70–99)
HCT VFR BLD CALC: 36.2 % — LOW (ref 39–50)
HCT VFR BLD CALC: 37 % — LOW (ref 39–50)
HDLC SERPL-MCNC: 38 MG/DL — LOW
HGB BLD-MCNC: 12 G/DL — LOW (ref 13–17)
HGB BLD-MCNC: 12.5 G/DL — LOW (ref 13–17)
IMM GRANULOCYTES NFR BLD AUTO: 0.4 % — SIGNIFICANT CHANGE UP (ref 0–0.9)
INR BLD: 0.95 — SIGNIFICANT CHANGE UP (ref 0.85–1.18)
LIPID PNL WITH DIRECT LDL SERPL: 40 MG/DL — SIGNIFICANT CHANGE UP
LYMPHOCYTES # BLD AUTO: 1.56 K/UL — SIGNIFICANT CHANGE UP (ref 1–3.3)
LYMPHOCYTES # BLD AUTO: 20.8 % — SIGNIFICANT CHANGE UP (ref 13–44)
MAGNESIUM SERPL-MCNC: 2.1 MG/DL — SIGNIFICANT CHANGE UP (ref 1.6–2.6)
MAGNESIUM SERPL-MCNC: 2.2 MG/DL — SIGNIFICANT CHANGE UP (ref 1.6–2.6)
MCHC RBC-ENTMCNC: 27.3 PG — SIGNIFICANT CHANGE UP (ref 27–34)
MCHC RBC-ENTMCNC: 27.7 PG — SIGNIFICANT CHANGE UP (ref 27–34)
MCHC RBC-ENTMCNC: 33.1 GM/DL — SIGNIFICANT CHANGE UP (ref 32–36)
MCHC RBC-ENTMCNC: 33.8 GM/DL — SIGNIFICANT CHANGE UP (ref 32–36)
MCV RBC AUTO: 82 FL — SIGNIFICANT CHANGE UP (ref 80–100)
MCV RBC AUTO: 82.3 FL — SIGNIFICANT CHANGE UP (ref 80–100)
MONOCYTES # BLD AUTO: 0.71 K/UL — SIGNIFICANT CHANGE UP (ref 0–0.9)
MONOCYTES NFR BLD AUTO: 9.5 % — SIGNIFICANT CHANGE UP (ref 2–14)
NEUTROPHILS # BLD AUTO: 4.91 K/UL — SIGNIFICANT CHANGE UP (ref 1.8–7.4)
NEUTROPHILS NFR BLD AUTO: 65.3 % — SIGNIFICANT CHANGE UP (ref 43–77)
NON HDL CHOLESTEROL: 77 MG/DL — SIGNIFICANT CHANGE UP
NRBC # BLD: 0 /100 WBCS — SIGNIFICANT CHANGE UP (ref 0–0)
NRBC # BLD: 0 /100 WBCS — SIGNIFICANT CHANGE UP (ref 0–0)
PLATELET # BLD AUTO: 171 K/UL — SIGNIFICANT CHANGE UP (ref 150–400)
PLATELET # BLD AUTO: 175 K/UL — SIGNIFICANT CHANGE UP (ref 150–400)
POTASSIUM SERPL-MCNC: 3.5 MMOL/L — SIGNIFICANT CHANGE UP (ref 3.5–5.3)
POTASSIUM SERPL-MCNC: 3.7 MMOL/L — SIGNIFICANT CHANGE UP (ref 3.5–5.3)
POTASSIUM SERPL-SCNC: 3.5 MMOL/L — SIGNIFICANT CHANGE UP (ref 3.5–5.3)
POTASSIUM SERPL-SCNC: 3.7 MMOL/L — SIGNIFICANT CHANGE UP (ref 3.5–5.3)
PROT SERPL-MCNC: 7.5 G/DL — SIGNIFICANT CHANGE UP (ref 6–8.3)
PROTHROM AB SERPL-ACNC: 10.8 SEC — SIGNIFICANT CHANGE UP (ref 9.5–13)
RBC # BLD: 4.4 M/UL — SIGNIFICANT CHANGE UP (ref 4.2–5.8)
RBC # BLD: 4.51 M/UL — SIGNIFICANT CHANGE UP (ref 4.2–5.8)
RBC # FLD: 12.4 % — SIGNIFICANT CHANGE UP (ref 10.3–14.5)
RBC # FLD: 12.5 % — SIGNIFICANT CHANGE UP (ref 10.3–14.5)
SODIUM SERPL-SCNC: 140 MMOL/L — SIGNIFICANT CHANGE UP (ref 135–145)
SODIUM SERPL-SCNC: 141 MMOL/L — SIGNIFICANT CHANGE UP (ref 135–145)
TRIGL SERPL-MCNC: 185 MG/DL — HIGH
WBC # BLD: 7.51 K/UL — SIGNIFICANT CHANGE UP (ref 3.8–10.5)
WBC # BLD: 8.03 K/UL — SIGNIFICANT CHANGE UP (ref 3.8–10.5)
WBC # FLD AUTO: 7.51 K/UL — SIGNIFICANT CHANGE UP (ref 3.8–10.5)
WBC # FLD AUTO: 8.03 K/UL — SIGNIFICANT CHANGE UP (ref 3.8–10.5)

## 2023-09-22 PROCEDURE — 99153 MOD SED SAME PHYS/QHP EA: CPT

## 2023-09-22 PROCEDURE — C1874: CPT

## 2023-09-22 PROCEDURE — 85610 PROTHROMBIN TIME: CPT

## 2023-09-22 PROCEDURE — 36415 COLL VENOUS BLD VENIPUNCTURE: CPT

## 2023-09-22 PROCEDURE — 80061 LIPID PANEL: CPT

## 2023-09-22 PROCEDURE — 85730 THROMBOPLASTIN TIME PARTIAL: CPT

## 2023-09-22 PROCEDURE — 80048 BASIC METABOLIC PNL TOTAL CA: CPT

## 2023-09-22 PROCEDURE — 93005 ELECTROCARDIOGRAM TRACING: CPT

## 2023-09-22 PROCEDURE — 99152 MOD SED SAME PHYS/QHP 5/>YRS: CPT

## 2023-09-22 PROCEDURE — 85027 COMPLETE CBC AUTOMATED: CPT

## 2023-09-22 PROCEDURE — 80053 COMPREHEN METABOLIC PANEL: CPT

## 2023-09-22 PROCEDURE — C1894: CPT

## 2023-09-22 PROCEDURE — C9600: CPT | Mod: RC

## 2023-09-22 PROCEDURE — 85347 COAGULATION TIME ACTIVATED: CPT

## 2023-09-22 PROCEDURE — C1769: CPT

## 2023-09-22 PROCEDURE — 83036 HEMOGLOBIN GLYCOSYLATED A1C: CPT

## 2023-09-22 PROCEDURE — 93010 ELECTROCARDIOGRAM REPORT: CPT

## 2023-09-22 PROCEDURE — 82553 CREATINE MB FRACTION: CPT

## 2023-09-22 PROCEDURE — C1725: CPT

## 2023-09-22 PROCEDURE — C1887: CPT

## 2023-09-22 PROCEDURE — 82550 ASSAY OF CK (CPK): CPT

## 2023-09-22 PROCEDURE — 92928 PRQ TCAT PLMT NTRAC ST 1 LES: CPT | Mod: RC

## 2023-09-22 PROCEDURE — 83735 ASSAY OF MAGNESIUM: CPT

## 2023-09-22 PROCEDURE — 85025 COMPLETE CBC W/AUTO DIFF WBC: CPT

## 2023-09-22 RX ORDER — SODIUM CHLORIDE 9 MG/ML
500 INJECTION INTRAMUSCULAR; INTRAVENOUS; SUBCUTANEOUS
Refills: 0 | Status: DISCONTINUED | OUTPATIENT
Start: 2023-09-22 | End: 2023-10-06

## 2023-09-22 RX ORDER — SODIUM CHLORIDE 9 MG/ML
250 INJECTION INTRAMUSCULAR; INTRAVENOUS; SUBCUTANEOUS ONCE
Refills: 0 | Status: COMPLETED | OUTPATIENT
Start: 2023-09-22 | End: 2023-09-22

## 2023-09-22 RX ORDER — ASPIRIN/CALCIUM CARB/MAGNESIUM 324 MG
1 TABLET ORAL
Qty: 30 | Refills: 11
Start: 2023-09-22 | End: 2024-09-15

## 2023-09-22 RX ORDER — ASPIRIN/CALCIUM CARB/MAGNESIUM 324 MG
81 TABLET ORAL ONCE
Refills: 0 | Status: COMPLETED | OUTPATIENT
Start: 2023-09-22 | End: 2023-09-22

## 2023-09-22 RX ORDER — TICAGRELOR 90 MG/1
90 TABLET ORAL EVERY 12 HOURS
Refills: 0 | Status: DISCONTINUED | OUTPATIENT
Start: 2023-09-22 | End: 2023-10-06

## 2023-09-22 RX ORDER — AMLODIPINE BESYLATE 2.5 MG/1
10 TABLET ORAL ONCE
Refills: 0 | Status: COMPLETED | OUTPATIENT
Start: 2023-09-22 | End: 2023-09-22

## 2023-09-22 RX ORDER — POTASSIUM CHLORIDE 20 MEQ
20 PACKET (EA) ORAL ONCE
Refills: 0 | Status: COMPLETED | OUTPATIENT
Start: 2023-09-22 | End: 2023-09-22

## 2023-09-22 RX ORDER — TICAGRELOR 90 MG/1
1 TABLET ORAL
Qty: 60 | Refills: 11
Start: 2023-09-22 | End: 2024-09-15

## 2023-09-22 RX ORDER — POTASSIUM CHLORIDE 20 MEQ
40 PACKET (EA) ORAL ONCE
Refills: 0 | Status: COMPLETED | OUTPATIENT
Start: 2023-09-22 | End: 2023-09-22

## 2023-09-22 RX ADMIN — SODIUM CHLORIDE 75 MILLILITER(S): 9 INJECTION INTRAMUSCULAR; INTRAVENOUS; SUBCUTANEOUS at 08:52

## 2023-09-22 RX ADMIN — Medication 81 MILLIGRAM(S): at 08:52

## 2023-09-22 RX ADMIN — Medication 40 MILLIEQUIVALENT(S): at 09:17

## 2023-09-22 RX ADMIN — SODIUM CHLORIDE 220 MILLILITER(S): 9 INJECTION INTRAMUSCULAR; INTRAVENOUS; SUBCUTANEOUS at 12:37

## 2023-09-22 RX ADMIN — AMLODIPINE BESYLATE 10 MILLIGRAM(S): 2.5 TABLET ORAL at 12:59

## 2023-09-22 RX ADMIN — Medication 20 MILLIEQUIVALENT(S): at 09:17

## 2023-09-22 RX ADMIN — SODIUM CHLORIDE 500 MILLILITER(S): 9 INJECTION INTRAMUSCULAR; INTRAVENOUS; SUBCUTANEOUS at 08:53

## 2023-09-22 RX ADMIN — TICAGRELOR 90 MILLIGRAM(S): 90 TABLET ORAL at 08:52

## 2023-09-22 NOTE — PROGRESS NOTE ADULT - SUBJECTIVE AND OBJECTIVE BOX
Interventional Cardiology PA Post PCI SDA Discharge Note      Patient without complaints. Ambulated and voided without difficulties    Ext: Right Radial: no hematoma, no bleeding, dressing; C/D/I             Pulses: intact RAD    A/P:    62 M, former smoker (previously documented ASA allergy however patient received ASA in the hospital and tolerated it well and was discharged on ASA 81 mg daily), significant FHx of CAD and PMHx of HTN, HLD and CAD s/p PCI (2014) with subsequent IVUS guided Scoreflex/RUSH pLAD 8/11/23 who presents for staged PCI of residual pRCA disease. Patient reports feeling well since the procedure and denies feeling any sx.    He reports compliance with DAPT (ASA and Brilinta). Patient originally presented to Dr. Farrar c/o exertional C/P and MORRIS. He was recommended for CTS by his physicians in [facility] however he refused. Subsequent nuclear stress test 7/27/23 revealed Small size, predominantly fixed, myocardial perfusion, defect of the inferior wall likely due to diaphragmatic attenuation artifact. Post stress EF: 55%. Patient underwent successful IVUS guided/Scoreflex/RUSH pLAD on 8/11/23. In light of patient’s risk factors and known residual CAD patient now presents for staged PCI of residual pRCA disease.      Cardiac Cath 8/11/23 at [facility]: IVUS guided Scoreflex/RUSH pLAD; LM mild (Ca++), mLAD 30%, dLAD mild, D1 60% (small vessel), OM1 60%, mLCx 10% ISR, pRCA 80%.    Patient is now s/p cardiac cath 9/22/23: RUSH x 1 to pRCA (80%), patent stent pLAD, first diag 60% ostial stenosis, first obtuse marginal 60% stenosis. R TR access.        1. Follow-up with PMD/Cardiologist Dr. Farrar in 1-2 weeks   2. Post procedure labs/EKG reviewed and stable.    3. Pt given instructions on importance of taking antiplatelet medication.    4. Stable for discharge as per attending Dr. Farrar after bed rest, pt voids, wrist stable and 30 minutes of ambulation.  5. Prescriptions for Aspirin and Brilinta e-prescribed and submitted to patient's pharmacy.  6. Patient will continue Crestor 20mg qd

## 2023-09-22 NOTE — CHART NOTE - NSCHARTNOTEFT_GEN_A_CORE
Interventional Cardiology Radial band Removal Note    s/p Heparin 			s/p Angiomax    Pt without complaints.  VSS.    Right/Left Radial access site D-Stat/Radar Hemoband in place, _____ hematoma, ___bleed  Radial pulse:    Hemostasis achieved with manual release of hemoband.    ____  Vasovagal reaction.    Meds given:    Right/Left Radial access site  _____ hematoma, ______ bleed  Radial pulse:    A/P:  s/p Dx Coronary Angiogram/FFR/IVUS/PTCA/Stent ([place] one)  -	continue to monitor  -	-OOB as tolerated  -	Post Procedure Instructions given  -                   Call [phone] if any access site issues. Interventional Cardiology Radial band Removal Note    s/p Heparin    Pt without complaints.  VSS.    Right Radial access site TR band in place, no hematoma, no bleed  Radial pulse: 2+    Hemostasis achieved with manual release of hemoband.    no  Vasovagal reaction.    Meds given: n/a    A/P:  s/p Stent ([place] one)  -	continue to monitor  -	-OOB as tolerated  -	Post Procedure Instructions given  -                   Call [phone] if any access site issues.

## 2023-09-29 DIAGNOSIS — I25.84 CORONARY ATHEROSCLEROSIS DUE TO CALCIFIED CORONARY LESION: ICD-10-CM

## 2023-09-29 DIAGNOSIS — I25.10 ATHEROSCLEROTIC HEART DISEASE OF NATIVE CORONARY ARTERY WITHOUT ANGINA PECTORIS: ICD-10-CM

## 2023-10-02 LAB — ISTAT ACTK (ACTIVATED CLOTTING TIME KAOLIN): 492 SEC — HIGH (ref 74–137)

## 2024-05-07 RX ORDER — ESOMEPRAZOLE MAGNESIUM 40 MG/1
1 CAPSULE, DELAYED RELEASE ORAL
Refills: 0 | DISCHARGE

## 2024-10-03 VITALS
HEART RATE: 54 BPM | WEIGHT: 169.98 LBS | RESPIRATION RATE: 16 BRPM | TEMPERATURE: 98 F | OXYGEN SATURATION: 98 % | DIASTOLIC BLOOD PRESSURE: 74 MMHG | SYSTOLIC BLOOD PRESSURE: 134 MMHG | HEIGHT: 69 IN

## 2024-10-03 NOTE — H&P ADULT - NSHPLABSRESULTS_GEN_ALL_CORE
12.3   7.64  )-----------( 195      ( 04 Oct 2024 09:24 )             38.0   10-04    142  |  105  |  12  ----------------------------<  115[H]  3.4[L]   |  29  |  0.99    Ca    9.0      04 Oct 2024 09:24  Mg     2.0     10-04    TPro  7.6  /  Alb  4.4  /  TBili  0.4  /  DBili  x   /  AST  17  /  ALT  16  /  AlkPhos  68  10-04    PT/INR - ( 04 Oct 2024 09:24 )   PT: 11.3 sec;   INR: 0.97          PTT - ( 04 Oct 2024 09:24 )  PTT:36.7 sec    ECG (10/4/24) - Sinus bradycardia, 58 BPM,

## 2024-10-03 NOTE — H&P ADULT - ASSESSMENT
Pt is a 63 former smoker YOM with a PMHx of CAD S/p multiple PCI's, HTN, and HLD.  Pt was seen by  on (9/3/24) C/o exertional chest tightness on right arm relieved with rest. In light of patient's risk factors, abnormal  CCS class 3 anginal/anginal-equivalent symptoms and history of CAD S/p multiple PCI's patient is referred to [facility] for cardiac catheterization w/ possible intervention if clinically indicated.      ASA III          Mallampati III  Patient is a candidate for sedation: yes  Sedation: Moderate    Risks & benefits of procedure and alternative therapy have been explained to the patient including but not limited to: allergic reaction, bleeding w/possible need for blood transfusion, infection, renal and vascular compromise, limb damage, arrhythmia, stroke, vessel dissection/perforation, Myocardial infarction, emergent CABG. Informed consent obtained and in chart. Consent obtained using a [employer]  (ID# [id number]).     Patient denies bleeding, BRBPR, melena, hematuria, hematemesis.        cc Bolus IV x 1 followed by NS 75 cc/hr x 2 hrs per Hydration Protocol. Creatinine WNL. No evidence of Fluid overload, No JVD, LE Edema, pulmonary congestion, or Ascites     Pt states that he has been on ASA 81 and Plavix 75 mg for multiple years. He mentions that his last dose of ASA 81 was 2 days ago and took Plavix yesterday. Pt given Plavix 150 mg and  mg prior to procedure. pt's hemoglobin 12.3 and Hematocrit 38.0. baseline Hemoglobin 12.5 and Hematocrit 37 from outpatient records. Pt's LFTs and platelets WNL.     - Pt's potassium 3.4, repleted with 60 mEq of potassium.

## 2024-10-03 NOTE — H&P ADULT - HISTORY OF PRESENT ILLNESS
Cardiologist:   Pharmacy:  Escort:     Pt is a 63 former smoker YOM with a PMHx of CAD S/p multiple PCI's, HTN, and HLD.  Pt was seen by  on (9/3/24) C/o exertional chest tightness on right arm relieved with rest. Pt walks between 5,000 to 10,000 steps a day as a . Has stopped smoking since 2 months ago. Pt underwent staged of proximal RCA (9/22/23) his previous stent in proximal LAD (8/11/23) was patent. Pt underwent a LHC at [facility] on (8/11/23) and was found to have severe 3vCAD. Previous stent in LCx was widely patent. He underwent succesful IVUS guided PCI/Stent of proximal LAD. Stage PCI was done of the proximal RCA after 2 weeks.      Pharmacological Nuclear Stress Test: (4/11/24) - There is no evidence of inducible ischemia, there is a small sized, predominantly fixed, myocardial perfusion defect of the inferior and inferolateral wall, right ventricular uptake was noted suggestive of RV enlargement, Stress ejection fraction is 52%.     In light of patient's risk factors, abnormal  CCS class 3 anginal/anginal-equivalent symptoms and history of CAD S/p multiple PCI's patient is referred to [facility] for cardiac catheterization w/ possible intervention if clinically indicated. show Cardiologist:   [facility] [place]  Escort: Daughter    Pt is a 63 former smoker YOM with a PMHx of CAD S/p multiple PCI's, HTN, and HLD.  Pt was seen by  on (9/3/24) C/o exertional chest tightness on right arm relieved with rest. Pt walks between 5,000 to 10,000 steps a day as a . Has stopped smoking since 2 months ago. Pt underwent staged of proximal RCA (9/22/23) his previous stent in proximal LAD (8/11/23) was patent. Pt underwent a LHC at [facility] on (8/11/23) and was found to have severe 3vCAD. Previous stent in LCx was widely patent. He underwent succesful IVUS guided PCI/Stent of proximal LAD. Stage PCI was done of the proximal RCA after 2 weeks.      Pharmacological Nuclear Stress Test: (4/11/24) - There is no evidence of inducible ischemia, there is a small sized, predominantly fixed, myocardial perfusion defect of the inferior and inferolateral wall, right ventricular uptake was noted suggestive of RV enlargement, Stress ejection fraction is 52%.     In light of patient's risk factors, abnormal  CCS class 3 anginal/anginal-equivalent symptoms and history of CAD S/p multiple PCI's patient is referred to [facility] for cardiac catheterization w/ possible intervention if clinically indicated.

## 2024-10-04 ENCOUNTER — OUTPATIENT (OUTPATIENT)
Dept: OUTPATIENT SERVICES | Facility: HOSPITAL | Age: 64
LOS: 1 days | Discharge: HOME | End: 2024-10-04
Payer: COMMERCIAL

## 2024-10-04 LAB
A1C WITH ESTIMATED AVERAGE GLUCOSE RESULT: 6.2 % — HIGH (ref 4–5.6)
ALBUMIN SERPL ELPH-MCNC: 4.4 G/DL — SIGNIFICANT CHANGE UP (ref 3.3–5)
ALP SERPL-CCNC: 68 U/L — SIGNIFICANT CHANGE UP (ref 40–120)
ALT FLD-CCNC: 16 U/L — SIGNIFICANT CHANGE UP (ref 10–45)
ANION GAP SERPL CALC-SCNC: 8 MMOL/L — SIGNIFICANT CHANGE UP (ref 5–17)
APTT BLD: 36.7 SEC — HIGH (ref 24.5–35.6)
AST SERPL-CCNC: 17 U/L — SIGNIFICANT CHANGE UP (ref 10–40)
BASOPHILS # BLD AUTO: 0.05 K/UL — SIGNIFICANT CHANGE UP (ref 0–0.2)
BASOPHILS NFR BLD AUTO: 0.7 % — SIGNIFICANT CHANGE UP (ref 0–2)
BILIRUB SERPL-MCNC: 0.4 MG/DL — SIGNIFICANT CHANGE UP (ref 0.2–1.2)
BUN SERPL-MCNC: 12 MG/DL — SIGNIFICANT CHANGE UP (ref 7–23)
CALCIUM SERPL-MCNC: 9 MG/DL — SIGNIFICANT CHANGE UP (ref 8.4–10.5)
CHLORIDE SERPL-SCNC: 105 MMOL/L — SIGNIFICANT CHANGE UP (ref 96–108)
CHOLEST SERPL-MCNC: 126 MG/DL — SIGNIFICANT CHANGE UP
CK MB CFR SERPL CALC: 2.4 NG/ML — SIGNIFICANT CHANGE UP (ref 0–6.7)
CK SERPL-CCNC: 205 U/L — HIGH (ref 30–200)
CO2 SERPL-SCNC: 29 MMOL/L — SIGNIFICANT CHANGE UP (ref 22–31)
CREAT SERPL-MCNC: 0.99 MG/DL — SIGNIFICANT CHANGE UP (ref 0.5–1.3)
EGFR: 87 ML/MIN/1.73M2 — SIGNIFICANT CHANGE UP
EOSINOPHIL # BLD AUTO: 0.28 K/UL — SIGNIFICANT CHANGE UP (ref 0–0.5)
EOSINOPHIL NFR BLD AUTO: 3.7 % — SIGNIFICANT CHANGE UP (ref 0–6)
ESTIMATED AVERAGE GLUCOSE: 131 MG/DL — HIGH (ref 68–114)
GLUCOSE SERPL-MCNC: 115 MG/DL — HIGH (ref 70–99)
HCT VFR BLD CALC: 38 % — LOW (ref 39–50)
HDLC SERPL-MCNC: 42 MG/DL — SIGNIFICANT CHANGE UP
HGB BLD-MCNC: 12.3 G/DL — LOW (ref 13–17)
IMM GRANULOCYTES NFR BLD AUTO: 0.3 % — SIGNIFICANT CHANGE UP (ref 0–0.9)
INR BLD: 0.97 — SIGNIFICANT CHANGE UP (ref 0.85–1.16)
LIPID PNL WITH DIRECT LDL SERPL: 57 MG/DL — SIGNIFICANT CHANGE UP
LYMPHOCYTES # BLD AUTO: 1.83 K/UL — SIGNIFICANT CHANGE UP (ref 1–3.3)
LYMPHOCYTES # BLD AUTO: 24 % — SIGNIFICANT CHANGE UP (ref 13–44)
MAGNESIUM SERPL-MCNC: 2 MG/DL — SIGNIFICANT CHANGE UP (ref 1.6–2.6)
MCHC RBC-ENTMCNC: 27.2 PG — SIGNIFICANT CHANGE UP (ref 27–34)
MCHC RBC-ENTMCNC: 32.4 GM/DL — SIGNIFICANT CHANGE UP (ref 32–36)
MCV RBC AUTO: 84.1 FL — SIGNIFICANT CHANGE UP (ref 80–100)
MONOCYTES # BLD AUTO: 0.68 K/UL — SIGNIFICANT CHANGE UP (ref 0–0.9)
MONOCYTES NFR BLD AUTO: 8.9 % — SIGNIFICANT CHANGE UP (ref 2–14)
NEUTROPHILS # BLD AUTO: 4.78 K/UL — SIGNIFICANT CHANGE UP (ref 1.8–7.4)
NEUTROPHILS NFR BLD AUTO: 62.4 % — SIGNIFICANT CHANGE UP (ref 43–77)
NON HDL CHOLESTEROL: 84 MG/DL — SIGNIFICANT CHANGE UP
NRBC # BLD: 0 /100 WBCS — SIGNIFICANT CHANGE UP (ref 0–0)
PLATELET # BLD AUTO: 195 K/UL — SIGNIFICANT CHANGE UP (ref 150–400)
POTASSIUM SERPL-MCNC: 3.4 MMOL/L — LOW (ref 3.5–5.3)
POTASSIUM SERPL-SCNC: 3.4 MMOL/L — LOW (ref 3.5–5.3)
PROT SERPL-MCNC: 7.6 G/DL — SIGNIFICANT CHANGE UP (ref 6–8.3)
PROTHROM AB SERPL-ACNC: 11.3 SEC — SIGNIFICANT CHANGE UP (ref 9.9–13.4)
RBC # BLD: 4.52 M/UL — SIGNIFICANT CHANGE UP (ref 4.2–5.8)
RBC # FLD: 12.4 % — SIGNIFICANT CHANGE UP (ref 10.3–14.5)
SODIUM SERPL-SCNC: 142 MMOL/L — SIGNIFICANT CHANGE UP (ref 135–145)
TRIGL SERPL-MCNC: 160 MG/DL — HIGH
WBC # BLD: 7.64 K/UL — SIGNIFICANT CHANGE UP (ref 3.8–10.5)
WBC # FLD AUTO: 7.64 K/UL — SIGNIFICANT CHANGE UP (ref 3.8–10.5)

## 2024-10-04 PROCEDURE — 82550 ASSAY OF CK (CPK): CPT

## 2024-10-04 PROCEDURE — C9600: CPT | Mod: LD

## 2024-10-04 PROCEDURE — 93005 ELECTROCARDIOGRAM TRACING: CPT

## 2024-10-04 PROCEDURE — 80061 LIPID PANEL: CPT

## 2024-10-04 PROCEDURE — C1894: CPT

## 2024-10-04 PROCEDURE — C1887: CPT

## 2024-10-04 PROCEDURE — C1769: CPT

## 2024-10-04 PROCEDURE — 93010 ELECTROCARDIOGRAM REPORT: CPT

## 2024-10-04 PROCEDURE — 80053 COMPREHEN METABOLIC PANEL: CPT

## 2024-10-04 PROCEDURE — 93458 L HRT ARTERY/VENTRICLE ANGIO: CPT | Mod: 26

## 2024-10-04 PROCEDURE — 83735 ASSAY OF MAGNESIUM: CPT

## 2024-10-04 PROCEDURE — 82553 CREATINE MB FRACTION: CPT

## 2024-10-04 PROCEDURE — 92978 ENDOLUMINL IVUS OCT C 1ST: CPT | Mod: LD

## 2024-10-04 PROCEDURE — 85610 PROTHROMBIN TIME: CPT

## 2024-10-04 PROCEDURE — 85025 COMPLETE CBC W/AUTO DIFF WBC: CPT

## 2024-10-04 PROCEDURE — 83036 HEMOGLOBIN GLYCOSYLATED A1C: CPT

## 2024-10-04 PROCEDURE — 85730 THROMBOPLASTIN TIME PARTIAL: CPT

## 2024-10-04 PROCEDURE — 93454 CORONARY ARTERY ANGIO S&I: CPT | Mod: 59

## 2024-10-04 RX ORDER — CHLORHEXIDINE GLUCONATE ORAL RINSE 1.2 MG/ML
1 SOLUTION DENTAL ONCE
Refills: 0 | Status: ACTIVE | OUTPATIENT
Start: 2024-10-04

## 2024-10-04 RX ORDER — SODIUM CHLORIDE 0.9 % (FLUSH) 0.9 %
1000 SYRINGE (ML) INJECTION
Refills: 0 | Status: ACTIVE | OUTPATIENT
Start: 2024-10-04 | End: 2024-10-04

## 2024-10-04 RX ORDER — CHLORTHALIDONE 25 MG
0.5 TABLET ORAL
Refills: 0 | DISCHARGE

## 2024-10-04 RX ORDER — SODIUM CHLORIDE 0.9 % (FLUSH) 0.9 %
250 SYRINGE (ML) INJECTION ONCE
Refills: 0 | Status: COMPLETED | OUTPATIENT
Start: 2024-10-04 | End: 2024-10-04

## 2024-10-04 RX ORDER — VALSARTAN 320 MG/1
1 TABLET ORAL
Refills: 0 | DISCHARGE

## 2024-10-04 RX ORDER — ROSUVASTATIN CALCIUM 20 MG/1
1 TABLET, COATED ORAL
Refills: 0 | DISCHARGE

## 2024-10-04 RX ORDER — FAMOTIDINE 40 MG
1 TABLET ORAL
Refills: 0 | DISCHARGE

## 2024-10-04 RX ORDER — METOPROLOL TARTRATE 50 MG
1 TABLET ORAL
Refills: 0 | DISCHARGE

## 2024-10-04 RX ORDER — ASPIRIN 325 MG
81 TABLET ORAL ONCE
Refills: 0 | Status: DISCONTINUED | OUTPATIENT
Start: 2024-10-04 | End: 2024-10-04

## 2024-10-04 RX ORDER — ASPIRIN 325 MG
162 TABLET ORAL ONCE
Refills: 0 | Status: COMPLETED | OUTPATIENT
Start: 2024-10-04 | End: 2024-10-04

## 2024-10-04 RX ADMIN — Medication 20 MILLIEQUIVALENT(S): at 10:38

## 2024-10-04 RX ADMIN — Medication 40 MILLIEQUIVALENT(S): at 10:38

## 2024-10-04 RX ADMIN — Medication 500 MILLILITER(S): at 10:34

## 2024-10-04 RX ADMIN — Medication 75 MILLILITER(S): at 10:34

## 2024-10-04 RX ADMIN — Medication 150 MILLIGRAM(S): at 10:34

## 2024-10-04 RX ADMIN — Medication 240 MILLILITER(S): at 13:25

## 2024-10-04 RX ADMIN — Medication 162 MILLIGRAM(S): at 10:34

## 2024-10-04 NOTE — PROGRESS NOTE ADULT - SUBJECTIVE AND OBJECTIVE BOX
Interventional Cardiology PA SDA Discharge Note    Patient without complaints. Ambulated and voided without difficulties    Afebrile, VSS    Ext: Right Radial : No hematoma, No bleeding, dressing; C/D/I      Pulses:    intact RAD/DP/PT to baseline     A/P:  Pt is a 63 former smoker YOM with a PMHx of CAD S/p multiple PCI's, HTN, and HLD.  Pt was seen by  on (9/3/24) C/o exertional chest tightness on right arm relieved with rest. Pt walks between 5,000 to 10,000 steps a day as a . Has stopped smoking since 2 months ago. Pt underwent staged of proximal RCA (9/22/23) his previous stent in proximal LAD (8/11/23) was patent. Pt underwent a LHC at [facility] on (8/11/23) and was found to have severe 3vCAD. Previous stent in LCx was widely patent. He underwent succesful IVUS guided PCI/Stent of proximal LAD. Stage PCI was done of the proximal RCA after 2 weeks.      Pharmacological Nuclear Stress Test: (4/11/24) - There is no evidence of inducible ischemia, there is a small sized, predominantly fixed, myocardial perfusion defect of the inferior and inferolateral wall, right ventricular uptake was noted suggestive of RV enlargement, Stress ejection fraction is 52%.     In light of patient's risk factors, abnormal  CCS class 3 anginal/anginal-equivalent symptoms and history of CAD S/p multiple PCI's patient is referred to [facility] for cardiac catheterization w/ possible intervention if clinically indicated.    S/p LHC 10/4/24: LM: large minor, LAD: widely patent stent in pLAD, LCx: widely patent stent in mLCx, RCA: patent stent in pRCA 20% ISR. EDP 15      ACCESS:  RRA  Post cath IVF: 240cc    INTERVENTIONALIST: Dr. Farrar (F: Hua)  F/U CARDS: Dr. Farrar  DAPT: ASA/Plavix  STATIN: Rosuvastatin 20mg      1.	Stable for discharge as per attending Dr. Farrar after bed rest, pt voids, groin/wrist stable and 30 minutes of ambulation.  2.	Follow-up with PMD/Cardiologist Charan in 1-2 weeks  3.	Discharged forms signed and copies in chart

## 2024-10-08 DIAGNOSIS — I25.110 ATHEROSCLEROTIC HEART DISEASE OF NATIVE CORONARY ARTERY WITH UNSTABLE ANGINA PECTORIS: ICD-10-CM
